# Patient Record
Sex: MALE | Employment: UNEMPLOYED | URBAN - METROPOLITAN AREA
[De-identification: names, ages, dates, MRNs, and addresses within clinical notes are randomized per-mention and may not be internally consistent; named-entity substitution may affect disease eponyms.]

---

## 2019-11-22 RX ORDER — METOPROLOL SUCCINATE 50 MG/1
50 TABLET, EXTENDED RELEASE ORAL DAILY
COMMUNITY

## 2019-11-22 RX ORDER — LISINOPRIL 20 MG/1
20 TABLET ORAL DAILY
COMMUNITY

## 2019-11-22 RX ORDER — MONTELUKAST SODIUM 10 MG/1
10 TABLET ORAL NIGHTLY
COMMUNITY

## 2019-11-22 RX ORDER — ASPIRIN/CALCIUM/MAG/ALUMINUM 325 MG
325 TABLET ORAL
COMMUNITY
End: 2019-12-20

## 2019-11-22 RX ORDER — ATORVASTATIN CALCIUM 40 MG/1
40 TABLET, FILM COATED ORAL DAILY
COMMUNITY

## 2019-12-17 PROBLEM — Z72.0 TOBACCO USER: Status: ACTIVE | Noted: 2019-12-17

## 2019-12-17 PROBLEM — J44.9 CHRONIC OBSTRUCTIVE LUNG DISEASE (HCC): Status: ACTIVE | Noted: 2019-12-17

## 2019-12-20 ENCOUNTER — OFFICE VISIT (OUTPATIENT)
Dept: PALLATIVE CARE | Age: 64
End: 2019-12-20
Payer: COMMERCIAL

## 2019-12-20 VITALS
OXYGEN SATURATION: 95 % | TEMPERATURE: 97.8 F | DIASTOLIC BLOOD PRESSURE: 71 MMHG | HEART RATE: 120 BPM | BODY MASS INDEX: 27.94 KG/M2 | RESPIRATION RATE: 20 BRPM | SYSTOLIC BLOOD PRESSURE: 107 MMHG | WEIGHT: 195.2 LBS | HEIGHT: 70 IN

## 2019-12-20 DIAGNOSIS — K59.03 CONSTIPATION DUE TO OPIOID THERAPY: ICD-10-CM

## 2019-12-20 DIAGNOSIS — Z71.89 OTHER SPECIFIED COUNSELING: ICD-10-CM

## 2019-12-20 DIAGNOSIS — G47.00 INSOMNIA, UNSPECIFIED TYPE: ICD-10-CM

## 2019-12-20 DIAGNOSIS — F41.9 ANXIETY: ICD-10-CM

## 2019-12-20 DIAGNOSIS — T40.2X5A CONSTIPATION DUE TO OPIOID THERAPY: ICD-10-CM

## 2019-12-20 DIAGNOSIS — Z51.5 PALLIATIVE CARE ENCOUNTER: ICD-10-CM

## 2019-12-20 DIAGNOSIS — R53.83 OTHER FATIGUE: ICD-10-CM

## 2019-12-20 DIAGNOSIS — G89.3 PAIN, NEOPLASM-RELATED: Primary | ICD-10-CM

## 2019-12-20 DIAGNOSIS — C34.90 MALIGNANT NEOPLASM OF LUNG, UNSPECIFIED LATERALITY, UNSPECIFIED PART OF LUNG (HCC): ICD-10-CM

## 2019-12-20 PROCEDURE — G8484 FLU IMMUNIZE NO ADMIN: HCPCS | Performed by: NURSE PRACTITIONER

## 2019-12-20 PROCEDURE — G8417 CALC BMI ABV UP PARAM F/U: HCPCS | Performed by: NURSE PRACTITIONER

## 2019-12-20 PROCEDURE — G8427 DOCREV CUR MEDS BY ELIG CLIN: HCPCS | Performed by: NURSE PRACTITIONER

## 2019-12-20 PROCEDURE — 4004F PT TOBACCO SCREEN RCVD TLK: CPT | Performed by: NURSE PRACTITIONER

## 2019-12-20 PROCEDURE — 99204 OFFICE O/P NEW MOD 45 MIN: CPT | Performed by: NURSE PRACTITIONER

## 2019-12-20 PROCEDURE — 3017F COLORECTAL CA SCREEN DOC REV: CPT | Performed by: NURSE PRACTITIONER

## 2019-12-20 PROCEDURE — 1123F ACP DISCUSS/DSCN MKR DOCD: CPT | Performed by: NURSE PRACTITIONER

## 2019-12-20 PROCEDURE — 4040F PNEUMOC VAC/ADMIN/RCVD: CPT | Performed by: NURSE PRACTITIONER

## 2019-12-20 RX ORDER — DEXAMETHASONE 4 MG/1
TABLET ORAL
COMMUNITY
Start: 2019-11-18 | End: 2020-02-14 | Stop reason: ALTCHOICE

## 2019-12-20 RX ORDER — TRAMADOL HYDROCHLORIDE 50 MG/1
TABLET ORAL
COMMUNITY
Start: 2019-12-02 | End: 2019-12-20

## 2019-12-20 RX ORDER — OLANZAPINE 2.5 MG/1
2.5 TABLET ORAL NIGHTLY
Qty: 30 TABLET | Refills: 3 | Status: SHIPPED | OUTPATIENT
Start: 2019-12-20 | End: 2020-02-14

## 2019-12-20 RX ORDER — OLANZAPINE 2.5 MG/1
2.5 TABLET ORAL NIGHTLY
Qty: 30 TABLET | Refills: 3 | Status: SHIPPED | OUTPATIENT
Start: 2019-12-20 | End: 2019-12-20

## 2019-12-20 RX ORDER — ONDANSETRON 8 MG/1
8 TABLET, ORALLY DISINTEGRATING ORAL
COMMUNITY
Start: 2019-11-18

## 2019-12-20 RX ORDER — TRAMADOL HYDROCHLORIDE 50 MG/1
50 TABLET ORAL EVERY 8 HOURS PRN
Qty: 120 TABLET | Refills: 0 | Status: SHIPPED | OUTPATIENT
Start: 2019-12-20 | End: 2020-02-14

## 2019-12-20 RX ORDER — PROCHLORPERAZINE MALEATE 10 MG
TABLET ORAL
Refills: 1 | COMMUNITY
Start: 2019-11-18 | End: 2019-12-20

## 2019-12-20 SDOH — HEALTH STABILITY: MENTAL HEALTH: HOW OFTEN DO YOU HAVE A DRINK CONTAINING ALCOHOL?: 2-3 TIMES A WEEK

## 2019-12-20 ASSESSMENT — ENCOUNTER SYMPTOMS
NAUSEA: 0
COLOR CHANGE: 0
CHEST TIGHTNESS: 0
SORE THROAT: 0
EYE DISCHARGE: 0
ABDOMINAL DISTENTION: 0
BLOOD IN STOOL: 0
DIARRHEA: 0
CHOKING: 0
STRIDOR: 0
CONSTIPATION: 1
COUGH: 0
RECTAL PAIN: 0
ANAL BLEEDING: 0
TROUBLE SWALLOWING: 0
VOICE CHANGE: 0
SHORTNESS OF BREATH: 0
WHEEZING: 0
APNEA: 0
BACK PAIN: 1
ABDOMINAL PAIN: 1
VOMITING: 0

## 2020-01-03 ENCOUNTER — TELEPHONE (OUTPATIENT)
Dept: PALLATIVE CARE | Age: 65
End: 2020-01-03

## 2020-01-03 RX ORDER — HYDROCODONE BITARTRATE AND ACETAMINOPHEN 5; 325 MG/1; MG/1
1 TABLET ORAL EVERY 6 HOURS PRN
Qty: 28 TABLET | Refills: 0 | Status: SHIPPED | OUTPATIENT
Start: 2020-01-03 | End: 2020-01-09 | Stop reason: SDUPTHER

## 2020-01-03 NOTE — TELEPHONE ENCOUNTER
Jameel Diana wife tells me he has had an increase in left shoulder pain since starting chemotherapy, he has a history of left shoulder rotator cuff injury, but is not a surgical candidate at this time due cancer. Oncology has him taking Dexamethasone 2 mg po daily for pain. Discussed possibility of adhesive capsulitis. Continue Dexamethasone, take with food. Start Norco 5/325 mg po every 6 hours prn pain. Ice to area. Move as tolerated.

## 2020-01-03 NOTE — TELEPHONE ENCOUNTER
Pt's wife called stating that the pain medication that Alonso Carry is on does not seem to be helping his arm pain. They would like to know if it can be changed to something else.  Please call the pt's wife back at [phone]

## 2020-01-09 RX ORDER — HYDROCODONE BITARTRATE AND ACETAMINOPHEN 5; 325 MG/1; MG/1
1 TABLET ORAL EVERY 6 HOURS PRN
Qty: 120 TABLET | Refills: 0 | Status: SHIPPED | OUTPATIENT
Start: 2020-01-09 | End: 2020-01-30 | Stop reason: DRUGHIGH

## 2020-01-30 ENCOUNTER — TELEPHONE (OUTPATIENT)
Dept: PALLATIVE CARE | Age: 65
End: 2020-01-30

## 2020-01-30 RX ORDER — HYDROCODONE BITARTRATE AND ACETAMINOPHEN 5; 325 MG/1; MG/1
1-2 TABLET ORAL EVERY 4 HOURS PRN
COMMUNITY
End: 2020-02-07 | Stop reason: SDUPTHER

## 2020-01-30 NOTE — TELEPHONE ENCOUNTER
Gracie Concepcion had a corticosteroid injection in his back this am, but he is having severe pain in his thoracic back, imaging done 1/20/202 shows progression of his cancer. I advise they try 2 tabs 5/325 Norco po every 4 hours, reminded of 24 hour call.

## 2020-01-30 NOTE — TELEPHONE ENCOUNTER
Dorcas iWlson, patient's wife called to report that Mónica Hernadez is having unmanaged back and left shoulder pain. Dorcas Wilson would like to speak with you for direction [phone].

## 2020-02-07 RX ORDER — HYDROCODONE BITARTRATE AND ACETAMINOPHEN 5; 325 MG/1; MG/1
1-2 TABLET ORAL EVERY 4 HOURS PRN
Qty: 90 TABLET | Refills: 0 | Status: SHIPPED | OUTPATIENT
Start: 2020-02-07 | End: 2020-02-22

## 2020-02-14 ENCOUNTER — OFFICE VISIT (OUTPATIENT)
Dept: PALLATIVE CARE | Age: 65
End: 2020-02-14
Payer: COMMERCIAL

## 2020-02-14 VITALS
RESPIRATION RATE: 20 BRPM | SYSTOLIC BLOOD PRESSURE: 104 MMHG | HEART RATE: 102 BPM | OXYGEN SATURATION: 96 % | WEIGHT: 175.8 LBS | BODY MASS INDEX: 25.22 KG/M2 | DIASTOLIC BLOOD PRESSURE: 72 MMHG

## 2020-02-14 PROBLEM — R06.02 SOB (SHORTNESS OF BREATH): Status: ACTIVE | Noted: 2020-02-14

## 2020-02-14 PROBLEM — M25.519 SHOULDER PAIN: Status: ACTIVE | Noted: 2020-02-14

## 2020-02-14 PROBLEM — Z51.5 PALLIATIVE CARE ENCOUNTER: Status: ACTIVE | Noted: 2020-02-14

## 2020-02-14 PROBLEM — G89.3 NEOPLASM RELATED PAIN: Status: ACTIVE | Noted: 2020-02-14

## 2020-02-14 PROBLEM — J44.1 COPD EXACERBATION (HCC): Status: ACTIVE | Noted: 2019-12-17

## 2020-02-14 PROBLEM — C34.90 MALIGNANT NEOPLASM OF LUNG (HCC): Status: ACTIVE | Noted: 2020-02-14

## 2020-02-14 PROCEDURE — G8427 DOCREV CUR MEDS BY ELIG CLIN: HCPCS | Performed by: FAMILY MEDICINE

## 2020-02-14 PROCEDURE — 99215 OFFICE O/P EST HI 40 MIN: CPT | Performed by: FAMILY MEDICINE

## 2020-02-14 PROCEDURE — G8926 SPIRO NO PERF OR DOC: HCPCS | Performed by: FAMILY MEDICINE

## 2020-02-14 PROCEDURE — 4004F PT TOBACCO SCREEN RCVD TLK: CPT | Performed by: FAMILY MEDICINE

## 2020-02-14 PROCEDURE — 4040F PNEUMOC VAC/ADMIN/RCVD: CPT | Performed by: FAMILY MEDICINE

## 2020-02-14 PROCEDURE — 1123F ACP DISCUSS/DSCN MKR DOCD: CPT | Performed by: FAMILY MEDICINE

## 2020-02-14 PROCEDURE — 3017F COLORECTAL CA SCREEN DOC REV: CPT | Performed by: FAMILY MEDICINE

## 2020-02-14 PROCEDURE — 3023F SPIROM DOC REV: CPT | Performed by: FAMILY MEDICINE

## 2020-02-14 PROCEDURE — G8484 FLU IMMUNIZE NO ADMIN: HCPCS | Performed by: FAMILY MEDICINE

## 2020-02-14 PROCEDURE — G8417 CALC BMI ABV UP PARAM F/U: HCPCS | Performed by: FAMILY MEDICINE

## 2020-02-14 RX ORDER — PREDNISONE 10 MG/1
TABLET ORAL
Qty: 30 TABLET | Refills: 0 | Status: SHIPPED | OUTPATIENT
Start: 2020-02-14

## 2020-02-14 RX ORDER — PROCHLORPERAZINE MALEATE 10 MG
TABLET ORAL
COMMUNITY
Start: 2020-01-27

## 2020-02-14 RX ORDER — DEXAMETHASONE 2 MG/1
TABLET ORAL
COMMUNITY
Start: 2020-01-10

## 2020-02-14 ASSESSMENT — ENCOUNTER SYMPTOMS
NAUSEA: 0
SORE THROAT: 0
TROUBLE SWALLOWING: 0
EYE DISCHARGE: 0
ABDOMINAL DISTENTION: 0
DIARRHEA: 0
STRIDOR: 0
BACK PAIN: 1
ABDOMINAL PAIN: 1
CHOKING: 0
VOMITING: 0
COLOR CHANGE: 0
CHEST TIGHTNESS: 0
CONSTIPATION: 1
WHEEZING: 1
VOICE CHANGE: 0
ANAL BLEEDING: 0
APNEA: 0
BLOOD IN STOOL: 0
COUGH: 1
RECTAL PAIN: 0
SHORTNESS OF BREATH: 1

## 2020-02-14 NOTE — PATIENT INSTRUCTIONS
strengthening exercise   1. Start by tying a piece of elastic exercise material to a doorknob. You can use surgical tubing or Thera-Band. (You may also hold one end of the band in each hand.)  2. Stand or sit with your shoulder relaxed and your elbow bent 90 degrees. Your upper arm should rest comfortably against your side. Squeeze a rolled towel between your elbow and your body for comfort. This will help keep your arm at your side. 3. Hold one end of the elastic band with the hand of the painful arm. 4. Start with your forearm across your belly. Slowly rotate the forearm out away from your body. Keep your elbow and upper arm tucked against the towel roll or the side of your body until you begin to feel tightness in your shoulder. Slowly move your arm back to where you started. 5. Repeat 8 to 12 times. Internal rotator strengthening exercise   1. Start by tying a piece of elastic exercise material to a doorknob. You can use surgical tubing or Thera-Band. 2. Stand or sit with your shoulder relaxed and your elbow bent 90 degrees. Your upper arm should rest comfortably against your side. Squeeze a rolled towel between your elbow and your body for comfort. This will help keep your arm at your side. 3. Hold one end of the elastic band in the hand of the painful arm. 4. Slowly rotate your forearm toward your body until it touches your belly. Slowly move it back to where you started. 5. Keep your elbow and upper arm firmly tucked against the towel roll or at your side. 6. Repeat 8 to 12 times. Pendulum swing   1. Hold on to a table or the back of a chair with your good arm. Then bend forward a little and let your sore arm hang straight down. This exercise does not use the arm muscles. Rather, use your legs and your hips to create movement that makes your arm swing freely. 2. Use the movement from your hips and legs to guide the slightly swinging arm back and forth like a pendulum (or elephant trunk).  Then guide it in circles that start small (about the size of a dinner plate). Make the circles a bit larger each day, as your pain allows. 3. Do this exercise for 5 minutes, 5 to 7 times each day. 4. As you have less pain, try bending over a little farther to do this exercise. This will increase the amount of movement at your shoulder. Follow-up care is a key part of your treatment and safety. Be sure to make and go to all appointments, and call your doctor if you are having problems. It's also a good idea to know your test results and keep a list of the medicines you take. Where can you learn more? Go to https://[employer]. org and sign in to your [employer] account. Enter H562 in the [employer] box to learn more about \"Shoulder Arthritis: Exercises. \"     If you do not have an account, please click on the \"Sign Up Now\" link. Current as of: June 26, 2019  Content Version: 12.3  © 6227-7167 Healthwise, Incorporated. Care instructions adapted under license by [facility] ([facility]). If you have questions about a medical condition or this instruction, always ask your healthcare professional. [address] any warranty or liability for your use of this information.

## 2020-02-14 NOTE — PROGRESS NOTES
Subjective:      Patient Id: Seen at [address] [place] [address] for symptom management. He was accompanied to the appointment by: his wife. Chief Complaint   Patient presents with    Shortness of Breath    Fatigue    Anorexia    Shoulder Pain     left        HPI       Paula Jimenez is a 71 y.o. male seen and examined for symptomatic mangement related to metastatic lung cancer. Pt is calm, cooperative and in NAD. States pain is well controlled on current regime. Rates pain at a 4/ 10 and states it occurs in his right shoulder, joints, and abdomen. Describes pain as a chronic ache. Currently taking diclofenac gel and Norco with relief. Denies Sedation, Constipation, or other adverse effects on current regime. SOB exacerbated x 1 week. States he is having increasing SOB with exertion and increased wheezing. No nebulizer or inhaler treatments ordered. Denies excessive fatigue, fever, chills, myalgias, nausea, vomiting, chest pain, or orthopnea. Of note is that patient has lost 20lbs in between visits. Though upon questioning it was found he is not taking zyprexa as prescribed and stopped taking it shortly after starting because he though it was supposed to aid him in sleeping only. Daughter states his appetite has increased in that last 3 days and patient is having approx 2-3 boost daily.     Past Medical History:   Diagnosis Date    Cancer ([address]     Cancer, metastatic ([address]     Diabetes ([address]     Heart attack ([facility] 2007     Past Surgical History:   Procedure Laterality Date    ROTATOR CUFF REPAIR Left 2013     Social History     Socioeconomic History    Marital status: Unknown     Spouse name: Not on file    Number of children: Not on file    Years of education: Not on file    Highest education level: Not on file   Occupational History    Not on file   Social Needs    Financial resource strain: Not on file    Food insecurity:     Worry: Not on file     Inability: Not on file    Transportation needs: (GLUCOPHAGE) 500 MG tablet Take 500 mg by mouth daily (with breakfast)      metoprolol succinate (TOPROL XL) 50 MG extended release tablet Take 50 mg by mouth daily      atorvastatin (LIPITOR) 40 MG tablet Take 40 mg by mouth daily      montelukast (SINGULAIR) 10 MG tablet Take 10 mg by mouth nightly      lisinopril (PRINIVIL;ZESTRIL) 20 MG tablet Take 20 mg by mouth daily       No current facility-administered medications on file prior to visit. Review of Systems   Constitutional: Positive for fatigue and unexpected weight change. Negative for activity change, appetite change, chills, diaphoresis and fever. HENT: Negative for drooling, hearing loss, mouth sores, sore throat, trouble swallowing and voice change. Eyes: Negative for discharge and visual disturbance. Respiratory: Positive for cough, shortness of breath and wheezing. Negative for apnea, choking, chest tightness and stridor. Cardiovascular: Negative for chest pain, palpitations and leg swelling. Gastrointestinal: Positive for abdominal pain and constipation. Negative for abdominal distention, anal bleeding, blood in stool, diarrhea, nausea, rectal pain and vomiting. Genitourinary: Negative for difficulty urinating, dysuria, enuresis, frequency and hematuria. Musculoskeletal: Positive for arthralgias and back pain. Negative for gait problem, joint swelling and myalgias. Skin: Negative for color change, pallor, rash and wound. Allergic/Immunologic: Negative for food allergies and immunocompromised state. Neurological: Negative for dizziness, tremors, seizures, syncope, facial asymmetry, speech difficulty, weakness, light-headedness, numbness and headaches. Hematological: Negative for adenopathy. Does not bruise/bleed easily. Psychiatric/Behavioral: Positive for sleep disturbance.  Negative for agitation, behavioral problems, confusion, decreased concentration, dysphoric mood, hallucinations, self-injury and suicidal place, and time. Cranial Nerves: No cranial nerve deficit. Motor: Weakness present. Coordination: Coordination normal.      Gait: Gait abnormal.   Psychiatric:         Behavior: Behavior normal.         Thought Content: Thought content normal.         Judgment: Judgment normal.         Assessment and Plan:      1. Neoplasm related pain  Controlled on current regime    2. Malignant neoplasm of lung, unspecified laterality, unspecified part of lung ([facility]  - F/U with heme onc as scheduled    3. Shoulder pain, unspecified chronicity, unspecified laterality  4. Pain, neoplasm-related  Controlled on current regime    Pt is tolerating current pain meds without adverse effects or over sedation. Lowest effective dose used. Pt advised to call and notify palliative care for any adverse effects or sedation  Pt is able to maintain adequate functional level and participate in ADLs  OARRS reviewed. There is no indication of aberrant behavior  Pt advised to call for increasing or uncontrolled pain. Risk vs benefit assessed. Pt educated on risk of addiction. Pt advised to take only as prescribed and not to change frequency of pain meds without consulting palliative care first.    5. SOB (shortness of breath)  6. COPD exacerbation ([facility]  - Exacerbated  - Instructed patient to hold dexamethasone and take prednisone taper to aid in symptom burden and to restart dexamethasone upon completion of taper.     - predniSONE (DELTASONE) 10 MG tablet; Take 4 tabs x 3 days then 3 tabs x 3 days then 2 tabs x 3 days then 1 tab x 3 days. Dispense: 30 tablet; Refill: 0    7. Anorexia  - Significant weight loss noted  - Educated patient to take zyprexa as prescribed as it may help in nausea con troll and appetitive stimulation    Small, frequent meals. Maximize calories and protein  Daily nutritional shakes    8. Palliative care encounter  - Call for any questions, concerns or change in condition.  Much support, guidance and active listening provided. Medications Discontinued During This Encounter   Medication Reason    traMADol (ULTRAM) 50 MG tablet LIST CLEANUP    dexamethasone (DECADRON) 4 MG tablet Therapy completed    OLANZapine (ZYPREXA) 2.5 MG tablet LIST CLEANUP       Discussed with patient/surrogate: POC, Treatment risks/benefits, and alternatives including as noted above. All questions were answered. Gave much active listening, presence, and emotional support. Due to acuity, symptomatology and high-risk medication management,   I advised patient to Return in about 4 weeks (around 3/13/2020), or if symptoms worsen or fail to improve. Total Time 40 mins   > 50% Time Spent Counseling/Care coordination?  yes     BIANCA Joya - CNP    Collaborating physician: Dr. Sandra Bob

## 2020-03-02 LAB
FOLATE: 12.6 NG/ML
TSH SERPL DL<=0.05 MIU/L-ACNC: 0.49 MIU/L (ref 0.44–3.9)
VITAMIN B-12: >1500 PG/ML (ref 211–911)
VITAMIN D 25-HYDROXY: 17 NG/ML

## 2020-03-13 ENCOUNTER — HOSPITAL ENCOUNTER (EMERGENCY)
Age: 65
Discharge: HOME | End: 2020-03-13
Attending: EMERGENCY MEDICINE
Payer: COMMERCIAL

## 2020-03-13 ENCOUNTER — APPOINTMENT (OUTPATIENT)
Dept: GENERAL RADIOLOGY | Age: 65
End: 2020-03-13
Payer: COMMERCIAL

## 2020-03-13 VITALS
HEIGHT: 69 IN | HEART RATE: 81 BPM | BODY MASS INDEX: 24.14 KG/M2 | OXYGEN SATURATION: 95 % | DIASTOLIC BLOOD PRESSURE: 70 MMHG | RESPIRATION RATE: 20 BRPM | WEIGHT: 163 LBS | SYSTOLIC BLOOD PRESSURE: 111 MMHG | TEMPERATURE: 97.3 F

## 2020-03-13 LAB
ALBUMIN SERPL-MCNC: 2.8 G/DL (ref 3.5–4.6)
ALP BLD-CCNC: 195 U/L (ref 35–104)
ALT SERPL-CCNC: 25 U/L (ref 0–41)
ANION GAP SERPL CALCULATED.3IONS-SCNC: 13 MEQ/L (ref 9–15)
ANISOCYTOSIS: ABNORMAL
AST SERPL-CCNC: 28 U/L (ref 0–40)
BASE EXCESS ARTERIAL: 2 (ref -3–3)
BASOPHILS ABSOLUTE: 0 K/UL (ref 0–0.2)
BASOPHILS RELATIVE PERCENT: 0.2 %
BILIRUB SERPL-MCNC: 0.7 MG/DL (ref 0.2–0.7)
BILIRUBIN URINE: NEGATIVE
BLOOD, URINE: NEGATIVE
BUN BLDV-MCNC: 9 MG/DL (ref 8–23)
CALCIUM IONIZED: 1.14 MMOL/L (ref 1.12–1.32)
CALCIUM SERPL-MCNC: 8 MG/DL (ref 8.5–9.9)
CHLORIDE BLD-SCNC: 93 MEQ/L (ref 95–107)
CLARITY: CLEAR
CO2: 24 MEQ/L (ref 20–31)
COLOR: YELLOW
CREAT SERPL-MCNC: 0.6 MG/DL (ref 0.7–1.2)
EKG ATRIAL RATE: 80 BPM
EKG P AXIS: 67 DEGREES
EKG P-R INTERVAL: 144 MS
EKG Q-T INTERVAL: 424 MS
EKG QRS DURATION: 80 MS
EKG QTC CALCULATION (BAZETT): 489 MS
EKG R AXIS: -8 DEGREES
EKG T AXIS: 109 DEGREES
EKG VENTRICULAR RATE: 80 BPM
EOSINOPHILS ABSOLUTE: 0 K/UL (ref 0–0.7)
EOSINOPHILS RELATIVE PERCENT: 0.2 %
GFR AFRICAN AMERICAN: >60
GFR AFRICAN AMERICAN: >60
GFR NON-AFRICAN AMERICAN: >60
GFR NON-AFRICAN AMERICAN: >60
GLOBULIN: 2.8 G/DL (ref 2.3–3.5)
GLUCOSE BLD-MCNC: 137 MG/DL (ref 60–115)
GLUCOSE BLD-MCNC: 147 MG/DL (ref 70–99)
GLUCOSE URINE: NEGATIVE MG/DL
HCO3 ARTERIAL: 27.5 MMOL/L (ref 21–29)
HCT VFR BLD CALC: 23.4 % (ref 42–52)
HEMOGLOBIN: 12.8 GM/DL (ref 13.5–17.5)
HEMOGLOBIN: 7.9 G/DL (ref 14–18)
KETONES, URINE: ABNORMAL MG/DL
LACTATE: 1.1 MMOL/L (ref 0.4–2)
LACTIC ACID: 2.2 MMOL/L (ref 0.5–2.2)
LEUKOCYTE ESTERASE, URINE: NEGATIVE
LYMPHOCYTES ABSOLUTE: 0.6 K/UL (ref 1–4.8)
LYMPHOCYTES RELATIVE PERCENT: 9.8 %
MACROCYTES: ABNORMAL
MAGNESIUM: 1.8 MG/DL (ref 1.7–2.4)
MCH RBC QN AUTO: 39.3 PG (ref 27–31.3)
MCHC RBC AUTO-ENTMCNC: 33.7 % (ref 33–37)
MCV RBC AUTO: 116.8 FL (ref 80–100)
MONOCYTES ABSOLUTE: 0.3 K/UL (ref 0.2–0.8)
MONOCYTES RELATIVE PERCENT: 4.9 %
NEUTROPHILS ABSOLUTE: 5.2 K/UL (ref 1.4–6.5)
NEUTROPHILS RELATIVE PERCENT: 84.9 %
NITRITE, URINE: NEGATIVE
O2 SAT, ARTERIAL: 93 % (ref 93–100)
PCO2 ARTERIAL: 49 MM HG (ref 35–45)
PDW BLD-RTO: 18.7 % (ref 11.5–14.5)
PERFORMED ON: ABNORMAL
PH ARTERIAL: 7.36 (ref 7.35–7.45)
PH UA: 7 (ref 5–9)
PLATELET # BLD: 81 K/UL (ref 130–400)
PLATELET SLIDE REVIEW: ABNORMAL
PO2 ARTERIAL: 71 MM HG (ref 75–108)
POC CHLORIDE: 98 MEQ/L (ref 99–110)
POC CREATININE: 0.6 MG/DL (ref 0.8–1.3)
POC HEMATOCRIT: 38 % (ref 41–53)
POC POTASSIUM: 3.3 MEQ/L (ref 3.5–5.1)
POC SAMPLE TYPE: ABNORMAL
POC SODIUM: 135 MEQ/L (ref 136–145)
POTASSIUM SERPL-SCNC: 3.7 MEQ/L (ref 3.4–4.9)
PROTEIN UA: NEGATIVE MG/DL
RBC # BLD: 2 M/UL (ref 4.7–6.1)
SODIUM BLD-SCNC: 130 MEQ/L (ref 135–144)
SPECIFIC GRAVITY UA: 1.01 (ref 1–1.03)
TCO2 ARTERIAL: 29 (ref 22–29)
TOTAL PROTEIN: 5.6 G/DL (ref 6.3–8)
TROPONIN: <0.01 NG/ML (ref 0–0.01)
TSH SERPL DL<=0.05 MIU/L-ACNC: 2.26 UIU/ML (ref 0.44–3.86)
UROBILINOGEN, URINE: 1 E.U./DL
WBC # BLD: 6.1 K/UL (ref 4.8–10.8)

## 2020-03-13 PROCEDURE — 84443 ASSAY THYROID STIM HORMONE: CPT

## 2020-03-13 PROCEDURE — 85014 HEMATOCRIT: CPT

## 2020-03-13 PROCEDURE — 85025 COMPLETE CBC W/AUTO DIFF WBC: CPT

## 2020-03-13 PROCEDURE — 81003 URINALYSIS AUTO W/O SCOPE: CPT

## 2020-03-13 PROCEDURE — 2580000003 HC RX 258: Performed by: EMERGENCY MEDICINE

## 2020-03-13 PROCEDURE — 36415 COLL VENOUS BLD VENIPUNCTURE: CPT

## 2020-03-13 PROCEDURE — 6360000002 HC RX W HCPCS: Performed by: EMERGENCY MEDICINE

## 2020-03-13 PROCEDURE — 83735 ASSAY OF MAGNESIUM: CPT

## 2020-03-13 PROCEDURE — 82565 ASSAY OF CREATININE: CPT

## 2020-03-13 PROCEDURE — 71045 X-RAY EXAM CHEST 1 VIEW: CPT

## 2020-03-13 PROCEDURE — 36600 WITHDRAWAL OF ARTERIAL BLOOD: CPT

## 2020-03-13 PROCEDURE — 99284 EMERGENCY DEPT VISIT MOD MDM: CPT

## 2020-03-13 PROCEDURE — 93005 ELECTROCARDIOGRAM TRACING: CPT | Performed by: EMERGENCY MEDICINE

## 2020-03-13 PROCEDURE — 83605 ASSAY OF LACTIC ACID: CPT

## 2020-03-13 PROCEDURE — 82330 ASSAY OF CALCIUM: CPT

## 2020-03-13 PROCEDURE — 96374 THER/PROPH/DIAG INJ IV PUSH: CPT

## 2020-03-13 PROCEDURE — 84295 ASSAY OF SERUM SODIUM: CPT

## 2020-03-13 PROCEDURE — 84484 ASSAY OF TROPONIN QUANT: CPT

## 2020-03-13 PROCEDURE — 82803 BLOOD GASES ANY COMBINATION: CPT

## 2020-03-13 PROCEDURE — 93010 ELECTROCARDIOGRAM REPORT: CPT | Performed by: INTERNAL MEDICINE

## 2020-03-13 PROCEDURE — 84132 ASSAY OF SERUM POTASSIUM: CPT

## 2020-03-13 PROCEDURE — 80053 COMPREHEN METABOLIC PANEL: CPT

## 2020-03-13 PROCEDURE — 82435 ASSAY OF BLOOD CHLORIDE: CPT

## 2020-03-13 RX ORDER — OXYCODONE HYDROCHLORIDE 5 MG/1
5 TABLET ORAL EVERY 6 HOURS PRN
Qty: 12 TABLET | Refills: 0 | Status: SHIPPED | OUTPATIENT
Start: 2020-03-13 | End: 2020-03-16

## 2020-03-13 RX ORDER — FENTANYL CITRATE 50 UG/ML
25 INJECTION, SOLUTION INTRAMUSCULAR; INTRAVENOUS ONCE
Status: COMPLETED | OUTPATIENT
Start: 2020-03-13 | End: 2020-03-13

## 2020-03-13 RX ORDER — 0.9 % SODIUM CHLORIDE 0.9 %
1000 INTRAVENOUS SOLUTION INTRAVENOUS ONCE
Status: COMPLETED | OUTPATIENT
Start: 2020-03-13 | End: 2020-03-13

## 2020-03-13 RX ORDER — FENTANYL 12 UG/H
1 PATCH TRANSDERMAL
Qty: 10 PATCH | Refills: 0 | Status: SHIPPED | OUTPATIENT
Start: 2020-03-13 | End: 2020-04-12

## 2020-03-13 RX ADMIN — SODIUM CHLORIDE 1000 ML: 9 INJECTION, SOLUTION INTRAVENOUS at 13:36

## 2020-03-13 RX ADMIN — FENTANYL CITRATE 25 MCG: 0.05 INJECTION, SOLUTION INTRAMUSCULAR; INTRAVENOUS at 12:45

## 2020-03-13 RX ADMIN — SODIUM CHLORIDE 1000 ML: 9 INJECTION, SOLUTION INTRAVENOUS at 12:33

## 2020-03-13 ASSESSMENT — ENCOUNTER SYMPTOMS
COUGH: 0
VOMITING: 0
DIARRHEA: 0
BACK PAIN: 0
SORE THROAT: 0
NAUSEA: 0
SHORTNESS OF BREATH: 0
ABDOMINAL PAIN: 0

## 2020-03-13 ASSESSMENT — PAIN SCALES - GENERAL
PAINLEVEL_OUTOF10: 0
PAINLEVEL_OUTOF10: 10
PAINLEVEL_OUTOF10: 10

## 2020-03-13 ASSESSMENT — PAIN DESCRIPTION - ORIENTATION: ORIENTATION: LEFT

## 2020-03-13 ASSESSMENT — PAIN DESCRIPTION - PAIN TYPE: TYPE: CHRONIC PAIN

## 2020-03-13 ASSESSMENT — PAIN DESCRIPTION - LOCATION: LOCATION: SHOULDER

## 2020-03-13 NOTE — ED NOTES
Bed: 04  Expected date:   Expected time:   Means of arrival:   Comments:  67M Weak, hypotension. 90/54, hr 74, 97%RA.  #20L[address]     Abhilash Davis RN  03/13/20 0601

## 2020-03-13 NOTE — ED NOTES
2 rx given to family member to fill for him per Dr. Renaldo Dave.      Belinda Iglesias, RN  03/13/20 1672

## 2020-03-13 NOTE — ED PROVIDER NOTES
HYDROCODONE-ACETAMINOPHEN (NORCO) 5-325 MG PER TABLET    Take 1-2 tablets by mouth every 4 hours as needed for Pain for up to 15 days. LISINOPRIL (PRINIVIL;ZESTRIL) 20 MG TABLET    Take 20 mg by mouth daily    METFORMIN (GLUCOPHAGE) 500 MG TABLET    Take 500 mg by mouth daily (with breakfast)    METOPROLOL SUCCINATE (TOPROL XL) 50 MG EXTENDED RELEASE TABLET    Take 50 mg by mouth daily    MONTELUKAST (SINGULAIR) 10 MG TABLET    Take 10 mg by mouth nightly    ONDANSETRON (ZOFRAN-ODT) 8 MG TBDP DISINTEGRATING TABLET    Take 8 mg by mouth    PREDNISONE (DELTASONE) 10 MG TABLET    Take 4 tabs x 3 days then 3 tabs x 3 days then 2 tabs x 3 days then 1 tab x 3 days. PROCHLORPERAZINE (COMPAZINE) 10 MG TABLET    TAKE 1 TABLET BY MOUTH EVERY 6 HOURS AS NEEDED FOR NAUSEA AND VOMITING       ALLERGIES     Demerol hcl [meperidine]    FAMILY HISTORY     History reviewed. No pertinent family history. SOCIAL HISTORY       Social History     Socioeconomic History    Marital status: Unknown     Spouse name: None    Number of children: None    Years of education: None    Highest education level: None   Occupational History    None   Social Needs    Financial resource strain: None    Food insecurity     Worry: None     Inability: None    Transportation needs     Medical: None     Non-medical: None   Tobacco Use    Smoking status: Current Every Day Smoker     Packs/day: 1.00     Years: 30.00     Pack years: 30.00    Smokeless tobacco: Never Used   Substance and Sexual Activity    Alcohol use:  Yes     Alcohol/week: 2.0 standard drinks     Types: 2 Cans of beer per week     Frequency: 2-3 times a week     Binge frequency: Weekly    Drug use: Never    Sexual activity: None   Lifestyle    Physical activity     Days per week: None     Minutes per session: None    Stress: None   Relationships    Social connections     Talks on phone: None     Gets together: None     Attends Holiness service: None     Active member of club or organization: None     Attends meetings of clubs or organizations: None     Relationship status: None    Intimate partner violence     Fear of current or ex partner: None     Emotionally abused: None     Physically abused: None     Forced sexual activity: None   Other Topics Concern    None   Social History Narrative    None         PHYSICAL EXAM       ED Triage Vitals   BP Temp Temp src Pulse Resp SpO2 Height Weight   -- -- -- -- -- -- -- --       Physical Exam  Vitals signs and nursing note reviewed. Constitutional:       Appearance: He is well-developed. HENT:      Head: Normocephalic. Right Ear: External ear normal.      Left Ear: External ear normal.   Eyes:      Conjunctiva/sclera: Conjunctivae normal.      Pupils: Pupils are equal, round, and reactive to light. Neck:      Musculoskeletal: Normal range of motion and neck supple. Cardiovascular:      Rate and Rhythm: Normal rate and regular rhythm. Heart sounds: Normal heart sounds. Pulmonary:      Effort: Pulmonary effort is normal.      Breath sounds: Normal breath sounds. Abdominal:      General: Bowel sounds are normal. There is no distension. Palpations: Abdomen is soft. Tenderness: There is no abdominal tenderness. Musculoskeletal: Normal range of motion. Skin:     General: Skin is warm and dry. Neurological:      Mental Status: He is alert and oriented to person, place, and time. MDM  75 yo male presents to the ED with weakness. Pt is afebrile in the ED, hemodynamically stable in the ED. Pt given 1 L NS, IV fentanyl in the ED. EKG shows NSR with HR 80, normal axis, normal intervals, no ST changes. ABG unremarkable. Labs remarkable for Na 130, glucose 147, Hb 7.9.  UA negative. CXR negative. Pt reassessed and feels completely better. Family asking for pain meds for the pt to go home. Pt was originally offered fentanyl patches and oxycodone.   Pt given prescription for fentanyl patches and oxycodone. Pt reassessed and feels completely better. Pt likely orthostatic from chemo/radiation. Pt given 2nd L NS in the ED. Pt and family educated about orthostatic hypotension. Pt given prescription for fentanyl patches and oxycodone. Pt given weakness warning signs and will f/u with oncology. Pt and family understand plan. FINAL IMPRESSION      1. Generalized weakness    2. Orthostatic hypotension    3.  Primary malignant neoplasm of lung metastatic to other site, unspecified laterality [facility])          DISPOSITION/PLAN   DISPOSITION Discharge - Pending Orders Complete 03/13/2020 02:26:56 PM        DISCHARGE MEDICATIONS:  [unfilled]         Misa Madrigal MD(electronically signed)  Attending Emergency Physician            Misa Madrigal MD  03/13/20 1021

## 2020-03-13 NOTE — ED TRIAGE NOTES
Pt to ER from [facility] PCP for c/o generalized weakness x about 2 months. Pt states he has cancer of multiple sites, including lung, stomach lining and bone and is undergoing radiation and PO chemo. Pt c/o generalized weakness, hypotension, poor appetite, chronic left shoulder pain. States he does not eat or drink much since starting treatment. Skin warm and dry. Respirations even and unlabored. No edema.

## 2020-03-18 ENCOUNTER — TELEPHONE (OUTPATIENT)
Dept: PALLATIVE CARE | Age: 65
End: 2020-03-18

## 2020-03-18 NOTE — TELEPHONE ENCOUNTER
Patient's wife Selam Pinion called to report that Yahir Allred is having SOB and oxygen level is dropping to 77% at night, as well as unmanaged pain. She is asking for oxygen today, and also expressed an interest in having a hospice referral. She states that she would like the phone number to [facility], but wanted to speak with Palliative Care as well. Divina Gun the phone number to [facility]. Alfie Rangel has seen Yahir Allred last. Selam Nieves can be reached at [phone].